# Patient Record
Sex: MALE | Race: WHITE | Employment: FULL TIME | URBAN - METROPOLITAN AREA
[De-identification: names, ages, dates, MRNs, and addresses within clinical notes are randomized per-mention and may not be internally consistent; named-entity substitution may affect disease eponyms.]

---

## 2019-02-22 ENCOUNTER — HOSPITAL ENCOUNTER (EMERGENCY)
Facility: CLINIC | Age: 54
Discharge: HOME | End: 2019-02-22
Attending: NURSE PRACTITIONER | Admitting: NURSE PRACTITIONER
Payer: OTHER MISCELLANEOUS

## 2019-02-22 ENCOUNTER — APPOINTMENT (OUTPATIENT)
Dept: GENERAL RADIOLOGY | Facility: CLINIC | Age: 54
End: 2019-02-22
Attending: NURSE PRACTITIONER
Payer: OTHER MISCELLANEOUS

## 2019-02-22 VITALS
HEIGHT: 70 IN | HEART RATE: 70 BPM | DIASTOLIC BLOOD PRESSURE: 85 MMHG | WEIGHT: 225 LBS | RESPIRATION RATE: 20 BRPM | SYSTOLIC BLOOD PRESSURE: 140 MMHG | BODY MASS INDEX: 32.21 KG/M2 | OXYGEN SATURATION: 96 % | TEMPERATURE: 97.9 F

## 2019-02-22 DIAGNOSIS — S61.011A LACERATION OF RIGHT THUMB WITHOUT FOREIGN BODY WITHOUT DAMAGE TO NAIL, INITIAL ENCOUNTER: ICD-10-CM

## 2019-02-22 PROCEDURE — 12001 RPR S/N/AX/GEN/TRNK 2.5CM/<: CPT

## 2019-02-22 PROCEDURE — 99283 EMERGENCY DEPT VISIT LOW MDM: CPT | Mod: 25

## 2019-02-22 PROCEDURE — 90471 IMMUNIZATION ADMIN: CPT

## 2019-02-22 PROCEDURE — 90715 TDAP VACCINE 7 YRS/> IM: CPT | Performed by: NURSE PRACTITIONER

## 2019-02-22 PROCEDURE — 25000128 H RX IP 250 OP 636: Performed by: NURSE PRACTITIONER

## 2019-02-22 PROCEDURE — 73140 X-RAY EXAM OF FINGER(S): CPT | Mod: RT

## 2019-02-22 RX ADMIN — CLOSTRIDIUM TETANI TOXOID ANTIGEN (FORMALDEHYDE INACTIVATED), CORYNEBACTERIUM DIPHTHERIAE TOXOID ANTIGEN (FORMALDEHYDE INACTIVATED), BORDETELLA PERTUSSIS TOXOID ANTIGEN (GLUTARALDEHYDE INACTIVATED), BORDETELLA PERTUSSIS FILAMENTOUS HEMAGGLUTININ ANTIGEN (FORMALDEHYDE INACTIVATED), BORDETELLA PERTUSSIS PERTACTIN ANTIGEN, AND BORDETELLA PERTUSSIS FIMBRIAE 2/3 ANTIGEN 0.5 ML: 5; 2; 2.5; 5; 3; 5 INJECTION, SUSPENSION INTRAMUSCULAR at 19:36

## 2019-02-22 ASSESSMENT — ENCOUNTER SYMPTOMS: WOUND: 1

## 2019-02-22 ASSESSMENT — MIFFLIN-ST. JEOR: SCORE: 1876.84

## 2019-02-22 NOTE — LETTER
February 22, 2019      To Whom It May Concern:      Max Ibrahim was seen in our Emergency Department today, 02/22/19. Max will need to limit lifting with the right hand to less than #10 until sutures are removed in 7-10 days.  He should be allowed to wear the splint during work and must not soak or get his hand wet.  He may return to work when improved.    Sincerely,        Marielle Boston, CNP

## 2019-02-22 NOTE — ED AVS SNAPSHOT
Emergency Department  [address] [place] [id number]  Phone:  [phone]  Fax:  [phone]                                    Max Ibrahim   MRN: [id number]    Department:   Emergency Department   Date of Visit:  2/22/2019           After Visit Summary Signature Page    I have received my discharge instructions, and my questions have been answered. I have discussed any challenges I see with this plan with the nurse or doctor.    ..........................................................................................................................................  Patient/Patient Representative Signature      ..........................................................................................................................................  Patient Representative Print Name and Relationship to Patient    ..................................................               ................................................  Date                                   Time    ..........................................................................................................................................  Reviewed by Signature/Title    ...................................................              ..............................................  Date                                               Time          22EPIC Rev 08/18

## 2019-02-23 NOTE — ED PROVIDER NOTES
"  History     Chief Complaint:  Laceration    HPI   Max Ibrahim is a 53 year old male who presents with concern for a laceration. The patient reports that 30 minutes ago he was fixing a loading dock garage , and states that his right hand became stuck in a chain similar to a bicycle chain. His reflex was to pull his hand out, which caused him to sustain a laceration to the dorsal aspect of his right thumb. He attempted to run this finger under water and then bandaged it before presenting here. He notes a tingling sensation in his thumb.     Allergies:  NKDA    Medications:   Wellbutrin  Prozac  Hydroxyzine  Trazodone    Past Medical History:    Central serous retinopathy  Cataract    Past Surgical History:    Lipoma of upper abdominal wall, excise    Family History:    CAD  Cancer    Social History:  Current smoker: 1 ppd  Positive for alcohol use.     Review of Systems   Skin: Positive for wound.   All other systems reviewed and are negative.    Physical Exam     Patient Vitals for the past 24 hrs:   BP Temp Temp src Pulse Heart Rate Resp SpO2 Height Weight   02/22/19 2105 140/85 -- -- 70 70 20 96 % -- --   02/22/19 1916 (!) 149/91 97.9  F (36.6  C) Oral 72 -- 16 97 % 1.778 m (5' 10\") 102.1 kg (225 lb)     Physical Exam  Nursing notes reviewed. Vitals reviewed.  General: Alert. Well kept.  Eyes:  Conjunctiva non-injected, non-icteric.  Neck/Throat: Moist mucous membranes. Normal voice.  Cardiac: Regular rhythm. Normal heart sounds.  Pulmonary: Clear and equal breath sounds bilaterally.   Musculoskeletal: Normal gross range of motion of all 4 extremities.   Neurological: Alert and oriented x4.   Skin: Warm and dry. 2.5 cm stellate laceration along radial aspect of right thumb distal to IP joint not involving the fingernail  Psych: Affect normal. Good eye contact.    Emergency Department Course     Imaging:  Radiographic findings were communicated with the patient who voiced understanding of the " findings.    XR finger right 2 views  There is no evidence of acute fracture or foreign body.  There may be an old avulsion fracture adjacent to the first MCP joint.  Again no acute fracture or subluxation. No evidence of radiopaque  foreign body.    Procedure:       Narrative: Procedure: Laceration Repair        LACERATION:  A superficial and stellate clean 2.5 cm laceration.      LOCATION:  Right thumb      FUNCTION:  Distally sensation, circulation, motor and tendon function are intact.      ANESTHESIA:  Digital block using 0.5% bupivicaine total of 4 mLs      PREPARATION:  Irrigation and Scrubbing with Normal Saline and Shur Clens      DEBRIDEMENT:  wound explored, no foreign body found and minimal debridement      CLOSURE:  Wound was closed with One Layer.  Skin closed with 5 x 4.0 Ethylon using interrupted sutures.    Interventions:  1936 Tdap 0.5 mL IM    Emergency Department Course:  Nursing notes and vitals reviewed. (1934) I performed an exam of the patient as documented above.     IV inserted. Medicine administered as documented above. Blood drawn. This was sent to the lab for further testing, results above.    The patient was sent for a finger XR while in the emergency department, findings above.     (2055) Finished repairing the patient's laceration as noted above.     Findings and plan explained to the Patient. Patient discharged home with instructions regarding supportive care, medications, and reasons to return. The importance of close follow-up was reviewed.     I personally reviewed the imaging results with the Patient and answered all related questions prior to discharge.       Impression & Plan      Medical Decision Making:    Max Ibrahim is a 53 year old male presents for evaluation of a laceration to his right thumb.  Patient sustained the laceration while at work attempting to fix a loading dock garage door. The wound was carefully evaluated and explored. There was no foreign body  identified. CMS is intact.  There is no evidence of muscular, tendon, bone, or nerve damage with this laceration. The laceration was closed as noted in the procedure note. The patient tolerated the procedure well .  Possible complications (infection, scarring) were reviewed with the patient. Appropriate wound dressing was placed and daily cares were discussed. Tetanus updated here. he will be discharged home. he was asked to follow up with primary care in 7-10 days for suture removal. he will return immediately for fevers, purulent drainage, erythema, increased pain or any other concerns. He was placed in an aluma-foam splint for protection of the wound. All questions were answered prior to discharge. The patient understands and agrees to this plan.      Diagnosis:    ICD-10-CM    1. Laceration of right thumb without foreign body without damage to nail, initial encounter S61.011A        Disposition:  discharged to home    Scribe Disclosure:  I, Jaime Gooden, am serving as a scribe on 2/22/2019 at 7:18 PM to personally document services performed by Marielle Boston CNP based on my observations and the provider's statements to me.     Jaime Gooden  2/22/2019    EMERGENCY DEPARTMENT       Marielle Boston CNP  02/22/19 5054

## 2019-03-01 ENCOUNTER — HOSPITAL ENCOUNTER (EMERGENCY)
Facility: CLINIC | Age: 54
End: 2019-03-01

## 2019-03-01 ENCOUNTER — HOSPITAL ENCOUNTER (EMERGENCY)
Facility: CLINIC | Age: 54
Discharge: HOME | End: 2019-03-01

## 2019-03-01 VITALS
SYSTOLIC BLOOD PRESSURE: 113 MMHG | WEIGHT: 225 LBS | OXYGEN SATURATION: 99 % | DIASTOLIC BLOOD PRESSURE: 69 MMHG | RESPIRATION RATE: 16 BRPM | BODY MASS INDEX: 32.21 KG/M2 | TEMPERATURE: 97.4 F | HEIGHT: 70 IN

## 2019-03-01 SDOH — HEALTH STABILITY: MENTAL HEALTH: HOW OFTEN DO YOU HAVE A DRINK CONTAINING ALCOHOL?: NEVER

## 2019-03-01 ASSESSMENT — MIFFLIN-ST. JEOR: SCORE: 1876.84
